# Patient Record
Sex: MALE
[De-identification: names, ages, dates, MRNs, and addresses within clinical notes are randomized per-mention and may not be internally consistent; named-entity substitution may affect disease eponyms.]

---

## 2019-02-13 PROBLEM — Z00.00 ENCOUNTER FOR PREVENTIVE HEALTH EXAMINATION: Status: ACTIVE | Noted: 2019-02-13

## 2019-02-21 ENCOUNTER — TRANSCRIPTION ENCOUNTER (OUTPATIENT)
Age: 49
End: 2019-02-21

## 2019-02-21 ENCOUNTER — APPOINTMENT (OUTPATIENT)
Dept: ORTHOPEDIC SURGERY | Facility: CLINIC | Age: 49
End: 2019-02-21

## 2019-02-21 ENCOUNTER — APPOINTMENT (OUTPATIENT)
Dept: ORTHOPEDIC SURGERY | Facility: CLINIC | Age: 49
End: 2019-02-21
Payer: COMMERCIAL

## 2019-02-21 VITALS — SYSTOLIC BLOOD PRESSURE: 194 MMHG | HEART RATE: 78 BPM | DIASTOLIC BLOOD PRESSURE: 125 MMHG

## 2019-02-21 VITALS — HEIGHT: 66 IN | WEIGHT: 190 LBS | BODY MASS INDEX: 30.53 KG/M2

## 2019-02-21 DIAGNOSIS — M22.2X1 PATELLOFEMORAL DISORDERS, RIGHT KNEE: ICD-10-CM

## 2019-02-21 DIAGNOSIS — M17.10 UNILATERAL PRIMARY OSTEOARTHRITIS, UNSPECIFIED KNEE: ICD-10-CM

## 2019-02-21 DIAGNOSIS — M22.2X2 PATELLOFEMORAL DISORDERS, LEFT KNEE: ICD-10-CM

## 2019-02-21 PROCEDURE — 73564 X-RAY EXAM KNEE 4 OR MORE: CPT | Mod: 50

## 2019-02-21 PROCEDURE — 99203 OFFICE O/P NEW LOW 30 MIN: CPT

## 2019-02-21 NOTE — DISCUSSION/SUMMARY
[de-identified] : Paco is a 49-year-old male bilateral patellofemoral arthritis. I have explained to the patient the anatomy of the patellofemoral joint. It includes the extensor mechanism (quadriceps tendon, quadriceps muscles, patella, patella tendon, and medial and lateral retinaculum). I have shown the patient that the articular cartilage gets a little softened. 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 If one theoretically were to remove or scrape all the articular cartilage, it would be identified as arthritis. Somewhat paradoxically, however, chondromalacia does not necessarily lead to arthritis or at least there is no evidence irrefutable at this point in time.\par \par Chondromalacia or anterior knee pain is a syndrome that hurts the patients more than it causes destruction to the knee; thus pain is not associated with destruction. Likewise, noises, cracking, and popping for the most part are not anymore significant than people cracking their knuckles. It is certainly not a sign of damage to the joint.\par Over 90% of the people with patellofemoral problems will get better if they understand the weight bearing stresses that are applied to the patellofemoral joint. The forces can range from 2 to 9 times your body weight per step and with abnormal alignment the average is usually higher. \par The treatment is to minimize weight-bearing stress and to strengthen the surrounding muscles. From a practical point of view, one can divide their lifestyle into activities of daily living, conditioning, and recreation. In activities of daily living one should feel free to walk around as necessary but only for functioning. If one has an option between stairs and an escalator, the latter is preferable.\par \par The conditioning aspect should be a non weight bearing program which is best achieved by bicycle riding at least 3 to 4 days a week. It should be done with increasing resistance for at least a half hour. The seat of the bike should always be kept at a position so that the knee stays within a 0 to 90 degree arc. This will avoid hyperextension and flexion beyond 90 degrees, which tends to aggravate symptoms of discomfort. Leg extension for stretching exercises are similarly kept within this arc of motion. Step machines are not advised as they tend to aggravate patellofemoral symptoms as do squats. A [place] [employer] machine is an alternative that is usually acceptable.\par The recreational part of their life is based on the fact that since pain is not leading to destruction, we will compromise and allow a recreational lifestyle. If indeed activity, albeit associated with impact does not yield any symptoms, they should feel free to participate. If an increase in activities is associated with increasing discomfort, the patient should use "common sense" and cut back on the level of activity. Recreation, however, is never to be used for conditioning even in an asymptomatic patient. The patient must always maintain a conditioning program. I think the patient understands this explanation.\par \par While over 90% of the people with this syndrome will do well non-operatively, some conscientious patients will still have symptoms. If so, they should be reevaluated to ascertain if they fall into a small category of people who require physical therapy or surgery\par At this time given that he is in minimal pain he will do a home exercise program. I will see him back once his pain starts to worsen. He agrees with the above plan all questions were answered\par \par Of note he had significantly elevated blood pressure and both arms today. I recommended that he see his cardiologist as soon as possible. Again no symptoms of the blood pressure but given the numbers I recommended he sees his cardiologist as soon as possible.

## 2019-02-21 NOTE — PHYSICAL EXAM
[de-identified] : Physical Exam:\par General: Well appearing, no acute distress, A&O\par Neurologic: A&Ox3, No focal deficits\par Head: NCAT without abrasions, lacerations, or ecchymosis to head, face, or scalp\par Eyes: No scleral icterus, no gross abnormalities\par Respiratory: Equal chest wall expansion bilaterally, no accessory muscle use\par Lymphatic: No lymphadenopathy palpated\par Skin: Warm and dry\par Psychiatric: Normal mood and affect\par Bilateral knee: Tenderness to palpation over the patella, no patellar apprehension. There is pain with patella grind test. Pain is worse in the left knee versus right knee. There is no tenderness to palpation over the medial or lateral joint line at zero or 90°. There is no opening to varus or valgus stress. The knee is stable to Lachman test. His only true drug. His negative posterior drawer. Motor and sensation are intact distally [de-identified] : \par The following radiographs were ordered and read by me during this patients visit. I reviewed each radiograph in detail with the patient and discussed the findings as highlighted below. \par \par 3 views of the bilateral knee were obtained today that show no fracture, dislocation. There is degenerative changes noted at the patellofemoral joint with the left worse than right. There is no malalignment. No obvious osseous abnormality. Otherwise unremarkable.

## 2019-02-21 NOTE — HISTORY OF PRESENT ILLNESS
[de-identified] : Paco is a 49-year-old male who comes in complaining of bilateral knee pain. He states the knee pain started about 3 or 4 months ago. He did have a history of knee pain which came and went although the last couple months the pain significantly worsened. He works at a [employer] where he was walking up significantly elevated stairs. That appear to cause significant pain in his knees. He currently now works at a [employer] and has a significant improvement in his knees. Pain is localized to the anteromedial portion of bilateral knees with the left knee worse than the right knee. He denies trauma or falls. He states he did take a Medrol Dosepak which is given by his primary care physician which significantly improved his symptoms in his knee. Did not do physical therapy. At this time he has significant improvement in pain his knees and states he feels about 90% better since 2 months ago.

## 2019-02-21 NOTE — CONSULT LETTER
[Dear  ___] : Dear  [unfilled], [Consult Letter:] : I had the pleasure of evaluating your patient, [unfilled]. [Please see my note below.] : Please see my note below. [Consult Closing:] : Thank you very much for allowing me to participate in the care of this patient.  If you have any questions, please do not hesitate to contact me. [Sincerely,] : Sincerely, [FreeTextEntry2] : Thong Hunter DO [FreeTextEntry3] : Benito Granados DO.\par Sports Medicine \par \par Orthopaedic Surgery\par [facility]\par \par [address] \par [address]\[place] [address]\par \par [address] [facility] \par [address] \[place] [address]\par \par Tel [phone]\par Fax [phone]\par \par

## 2019-02-21 NOTE — REVIEW OF SYSTEMS
[Joint Pain] : joint pain [Joint Stiffness] : joint stiffness [Negative] : Heme/Lymph [FreeTextEntry9] : bilateral knee

## 2025-05-24 ENCOUNTER — APPOINTMENT (OUTPATIENT)
Dept: CT IMAGING | Facility: CLINIC | Age: 55
End: 2025-05-24